# Patient Record
Sex: MALE | Race: WHITE
[De-identification: names, ages, dates, MRNs, and addresses within clinical notes are randomized per-mention and may not be internally consistent; named-entity substitution may affect disease eponyms.]

---

## 2019-01-23 PROBLEM — Z00.00 ENCOUNTER FOR PREVENTIVE HEALTH EXAMINATION: Status: ACTIVE | Noted: 2019-01-23

## 2019-02-10 ENCOUNTER — TRANSCRIPTION ENCOUNTER (OUTPATIENT)
Age: 69
End: 2019-02-10

## 2019-03-11 ENCOUNTER — APPOINTMENT (OUTPATIENT)
Dept: NEUROLOGY | Facility: CLINIC | Age: 69
End: 2019-03-11
Payer: COMMERCIAL

## 2019-03-11 VITALS
HEART RATE: 60 BPM | SYSTOLIC BLOOD PRESSURE: 187 MMHG | TEMPERATURE: 97 F | HEIGHT: 72 IN | WEIGHT: 300 LBS | DIASTOLIC BLOOD PRESSURE: 100 MMHG | BODY MASS INDEX: 40.63 KG/M2

## 2019-03-11 DIAGNOSIS — R51 HEADACHE: ICD-10-CM

## 2019-03-11 PROCEDURE — 99244 OFF/OP CNSLTJ NEW/EST MOD 40: CPT

## 2019-03-11 NOTE — ASSESSMENT
[FreeTextEntry1] : Mr. Greenfield is a 68-year-old man with most likely a primary headache - possible tension-type headache.\par His headache is not consistent with giant cell arteritis, clinically.\par MRI of brain to exclude any secondary headache.\par Follow up with his primary doctor for management of hypertension.

## 2019-03-11 NOTE — CONSULT LETTER
[Dear  ___] : Dear  [unfilled], [FreeTextEntry2] : Dr. Gabe Rico  [FreeTextEntry1] : I had the pleasure of evaluating your patient CORRINE DAUGHERTY. Please see the assessment section for a summary of my diagnostic impression and plan.\par \par Thank you very much for allowing me to participate in the care of this patient. If you have any questions, please do not hesitate to contact me.\par \par Sincerely,\par \par Page Christine MD\par

## 2019-03-11 NOTE — HISTORY OF PRESENT ILLNESS
[FreeTextEntry1] : Mr. Greenfield is a 68-year-old man who began to have headaches towards the end of December of 2018. It is a left supraorbital dull pain. It can stay with him for a few hours. It usually occurs later in the day. Sometimes he goes to bed with it and wakes up with it. Tylenol and Advil have not helped. He has no history of recent head trauma. The neurological review of systems is otherwise negative. There is no jaw claudication or scalp tenderness.  He has no muscle aches.  The headache has improved recently.\par He has a history of obstructive sleep apnea for the past 15 years and wears a CPAP mask.\par For several years he has had visual flashes and floaters and was told that he had a vitreous detachment.\par He has a history of diabetes mellitus with a peripheral neuropathy.

## 2019-03-11 NOTE — PHYSICAL EXAM
[FreeTextEntry1] : Physical examination \par General: No acute distress, Awake, Alert.   \par Fundus: disc margins sharp.   \par Neck: no Carotid bruit.   \par Cardiovascular: Normal rate, Regular rhythm, No murmur.  \par No scalp tenderness, no temporal tenderness, no supraorbital notch tenderness.\par \par Mental status \par Awake, alert, and oriented to person, time and place, Normal attention span and concentration, Recent and remote memory intact, Language intact, Fund of knowledge intact.   \par \par Cranial Nerves \par II: VFF  \par III, IV, VI: PERRL, EOMI.   \par V: Facial sensation is normal B/L.   \par VII: Facial strength is normal B/L. \par VIII: Gross hearing is intact.   \par IX, X: Palate is midline and elevates symmetrically.   \par XI: Trapezius normal strength.   \par XII: Tongue midline without atrophy or fasciculations. \par \par Motor exam  \par Muscle tone - no evidence of rigidity or resistance in all 4 extremities.  \par No atrophy or fasciculations \par Muscle Strength: arms and legs, proximal and distal flexors and extensors are normal \par No UE drift.\par \par Reflexes \par All present, normal, and symmetrical.   \par \par Plantars right: mute.   \par Plantars left: mute.   \par \par \par Coordination \par Finger to nose: Normal.  \par Heel to shin: Normal.   \par \par \par Sensory \par Decrease sensation to pinprick and cold in a stocking distribution. Decreased sensation to vibration in the toes.\par \par \par \par Gait \par Normal including heels, toes, and tandem gait.  \par \par \par

## 2019-03-11 NOTE — REVIEW OF SYSTEMS
[Negative] : Heme/Lymph [Depression] : depression [Cluster Headache] : cluster headaches [PND] : PND [FreeTextEntry3] : change in vision  [FreeTextEntry4] : ringing in ears  [FreeTextEntry8] : frequent urination, nighttime urination  [FreeTextEntry9] : back pain

## 2019-03-20 ENCOUNTER — RESULT REVIEW (OUTPATIENT)
Age: 69
End: 2019-03-20

## 2019-03-28 ENCOUNTER — RESULT REVIEW (OUTPATIENT)
Age: 69
End: 2019-03-28

## 2019-08-22 ENCOUNTER — TRANSCRIPTION ENCOUNTER (OUTPATIENT)
Age: 69
End: 2019-08-22

## 2022-01-11 ENCOUNTER — APPOINTMENT (OUTPATIENT)
Dept: PULMONOLOGY | Facility: CLINIC | Age: 72
End: 2022-01-11
Payer: MEDICARE

## 2022-01-11 VITALS — BODY MASS INDEX: 40.63 KG/M2 | WEIGHT: 300 LBS | HEIGHT: 72 IN

## 2022-01-11 DIAGNOSIS — E11.9 TYPE 2 DIABETES MELLITUS W/OUT COMPLICATIONS: ICD-10-CM

## 2022-01-11 DIAGNOSIS — F17.200 NICOTINE DEPENDENCE, UNSPECIFIED, UNCOMPLICATED: ICD-10-CM

## 2022-01-11 DIAGNOSIS — F31.60 BIPOLAR DISORDER, CURRENT EPISODE MIXED, UNSPECIFIED: ICD-10-CM

## 2022-01-11 DIAGNOSIS — I10 ESSENTIAL (PRIMARY) HYPERTENSION: ICD-10-CM

## 2022-01-11 DIAGNOSIS — N40.0 BENIGN PROSTATIC HYPERPLASIA WITHOUT LOWER URINARY TRACT SYMPMS: ICD-10-CM

## 2022-01-11 PROCEDURE — 99213 OFFICE O/P EST LOW 20 MIN: CPT | Mod: 95

## 2022-01-11 RX ORDER — BUSPIRONE HCL 5 MG
TABLET ORAL
Refills: 0 | Status: ACTIVE | COMMUNITY

## 2022-01-11 RX ORDER — LISINOPRIL 30 MG/1
TABLET ORAL
Refills: 0 | Status: ACTIVE | COMMUNITY

## 2022-01-11 RX ORDER — METFORMIN HYDROCHLORIDE 625 MG/1
TABLET ORAL
Refills: 0 | Status: ACTIVE | COMMUNITY

## 2022-01-11 RX ORDER — HYDROCHLOROTHIAZIDE 12.5 MG/1
TABLET ORAL
Refills: 0 | Status: ACTIVE | COMMUNITY

## 2022-01-11 RX ORDER — DESVENLAFAXINE SUCCINATE 25 MG/1
TABLET, EXTENDED RELEASE ORAL
Refills: 0 | Status: ACTIVE | COMMUNITY

## 2022-01-11 RX ORDER — AMLODIPINE BESYLATE 5 MG/1
TABLET ORAL
Refills: 0 | Status: ACTIVE | COMMUNITY

## 2022-01-11 NOTE — ASSESSMENT
[FreeTextEntry1] : 71 year  old man  with sleep apnea is doing well with the CPAP.  Patient is compliant with the CPAP and benefited significantly with the CPAP.\par Will do a study and order a new cpap after the sleep study. For now will send prescription refill for supplies.

## 2022-01-11 NOTE — HISTORY OF PRESENT ILLNESS
[FreeTextEntry1] :  71 -year-old male with a history of diabetes, BPH, hypertension,\par bipolar disorder is seen for management of sleep\par apnea. The patient does not know where he had the study done\par and he does not have any\par reports from before . I looked at the data on the machine.\par He is on CPAP at 13 cm of pressure. The machine is an old [employer]med machine\par  His sleep varies anywhere from five to eight hours. He goes to bed\par at around 11:00 p.m. and he is up in the morning at 5:00 a.m. on an average.  \par [facility]\par uses nasal pillows\par

## 2022-01-11 NOTE — REASON FOR VISIT
[Home] : at home, [unfilled] , at the time of the visit. [Medical Office: ([facility])___] : at the medical office located in  [Verbal consent obtained from patient] : the patient, [unfilled] [Initial Evaluation] : an initial evaluation

## 2022-06-30 ENCOUNTER — RESULT REVIEW (OUTPATIENT)
Age: 72
End: 2022-06-30

## 2023-01-14 ENCOUNTER — NON-APPOINTMENT (OUTPATIENT)
Age: 73
End: 2023-01-14

## 2023-02-01 ENCOUNTER — APPOINTMENT (OUTPATIENT)
Dept: PULMONOLOGY | Facility: CLINIC | Age: 73
End: 2023-02-01
Payer: MEDICARE

## 2023-02-01 VITALS
HEART RATE: 60 BPM | BODY MASS INDEX: 40.63 KG/M2 | DIASTOLIC BLOOD PRESSURE: 80 MMHG | WEIGHT: 300 LBS | HEIGHT: 72 IN | SYSTOLIC BLOOD PRESSURE: 160 MMHG

## 2023-02-01 PROCEDURE — 99213 OFFICE O/P EST LOW 20 MIN: CPT

## 2023-02-01 NOTE — ASSESSMENT
[FreeTextEntry1] : 72 year  old man  with sleep apnea is doing well with the CPAP.  Patient is compliant with the CPAP and benefited significantly with the CPAP.\par Continue at 9 cm of cpap ,sent order for supplies.

## 2023-02-01 NOTE — HISTORY OF PRESENT ILLNESS
[FreeTextEntry1] :  Gabe Myles\par 72 -year-old male with a history of diabetes, BPH, hypertension,\par bipolar disorder is seen for management of sleep\par apnea.  I looked at the data on the machine.\par He is on CPAP at 9 cm of pressure. The machine is jennifer [id number]\par  His sleep varies anywhere from five to eight hours. He goes to bed\par at around 9.30 p.m. and he is up in the morning at 4.30 a.m. on an average.  \par [facility]\par uses nasal pillows\par \par fax number to send download data and note - [phone] (earlene)\par

## 2023-09-20 ENCOUNTER — NON-APPOINTMENT (OUTPATIENT)
Age: 73
End: 2023-09-20

## 2023-09-20 ENCOUNTER — APPOINTMENT (OUTPATIENT)
Dept: PULMONOLOGY | Facility: CLINIC | Age: 73
End: 2023-09-20

## 2023-09-20 VITALS
HEART RATE: 80 BPM | TEMPERATURE: 97.5 F | HEIGHT: 72 IN | SYSTOLIC BLOOD PRESSURE: 132 MMHG | BODY MASS INDEX: 40.63 KG/M2 | RESPIRATION RATE: 16 BRPM | OXYGEN SATURATION: 96 % | WEIGHT: 300 LBS | DIASTOLIC BLOOD PRESSURE: 70 MMHG

## 2023-09-20 VITALS
WEIGHT: 300 LBS | HEART RATE: 60 BPM | HEIGHT: 72 IN | SYSTOLIC BLOOD PRESSURE: 120 MMHG | DIASTOLIC BLOOD PRESSURE: 60 MMHG | BODY MASS INDEX: 40.63 KG/M2

## 2023-09-20 DIAGNOSIS — G47.33 OBSTRUCTIVE SLEEP APNEA (ADULT) (PEDIATRIC): ICD-10-CM

## 2023-09-20 PROBLEM — Z00.00 ENCOUNTER FOR PREVENTIVE HEALTH EXAMINATION: Status: ACTIVE | Noted: 2023-09-20

## 2023-10-20 ENCOUNTER — TRANSCRIPTION ENCOUNTER (OUTPATIENT)
Age: 73
End: 2023-10-20

## 2023-10-21 ENCOUNTER — NON-APPOINTMENT (OUTPATIENT)
Age: 73
End: 2023-10-21

## 2024-02-14 ENCOUNTER — APPOINTMENT (OUTPATIENT)
Dept: PULMONOLOGY | Facility: CLINIC | Age: 74
End: 2024-02-14

## 2024-09-25 ENCOUNTER — APPOINTMENT (OUTPATIENT)
Dept: PULMONOLOGY | Facility: CLINIC | Age: 74
End: 2024-09-25
Payer: MEDICARE

## 2024-09-25 VITALS
HEIGHT: 72 IN | SYSTOLIC BLOOD PRESSURE: 110 MMHG | BODY MASS INDEX: 39.28 KG/M2 | HEART RATE: 60 BPM | DIASTOLIC BLOOD PRESSURE: 60 MMHG | WEIGHT: 290 LBS

## 2024-09-25 DIAGNOSIS — I10 ESSENTIAL (PRIMARY) HYPERTENSION: ICD-10-CM

## 2024-09-25 DIAGNOSIS — G47.33 OBSTRUCTIVE SLEEP APNEA (ADULT) (PEDIATRIC): ICD-10-CM

## 2024-09-25 PROCEDURE — 99213 OFFICE O/P EST LOW 20 MIN: CPT

## 2024-09-25 NOTE — ASSESSMENT
[FreeTextEntry1] : 73 year  old man  with sleep apnea is doing well with the CPAP.  Patient is compliant with the CPAP and benefited significantly with the CPAP.

## 2024-09-25 NOTE — HISTORY OF PRESENT ILLNESS
[FreeTextEntry1] :  Gabe Myels 73 -year-old male with a history of diabetes,  hypertension, bipolar disorder is seen for management of sleep apnea.  I looked at the data on the machine. He is on CPAP at 9 cm of pressure. The machine is jennifer g3  His sleep varies anywhere from five to 7 hours. He goes to bed at around 9.30 p.m. and he is up in the morning at 4.30 a.m. on an average.   [facility]